# Patient Record
Sex: FEMALE | Race: WHITE | NOT HISPANIC OR LATINO | URBAN - METROPOLITAN AREA
[De-identification: names, ages, dates, MRNs, and addresses within clinical notes are randomized per-mention and may not be internally consistent; named-entity substitution may affect disease eponyms.]

---

## 2022-08-09 ENCOUNTER — EMERGENCY (EMERGENCY)
Facility: HOSPITAL | Age: 27
LOS: 1 days | Discharge: HOME | End: 2022-08-09
Attending: EMERGENCY MEDICINE | Admitting: EMERGENCY MEDICINE

## 2022-08-09 VITALS
TEMPERATURE: 98 F | OXYGEN SATURATION: 100 % | SYSTOLIC BLOOD PRESSURE: 97 MMHG | HEART RATE: 62 BPM | DIASTOLIC BLOOD PRESSURE: 62 MMHG | RESPIRATION RATE: 18 BRPM

## 2022-08-09 VITALS
SYSTOLIC BLOOD PRESSURE: 99 MMHG | DIASTOLIC BLOOD PRESSURE: 65 MMHG | RESPIRATION RATE: 18 BRPM | HEART RATE: 57 BPM | OXYGEN SATURATION: 100 % | TEMPERATURE: 98 F | WEIGHT: 134.92 LBS | HEIGHT: 65 IN

## 2022-08-09 DIAGNOSIS — R42 DIZZINESS AND GIDDINESS: ICD-10-CM

## 2022-08-09 DIAGNOSIS — I45.6 PRE-EXCITATION SYNDROME: ICD-10-CM
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
## 2022-08-09 DIAGNOSIS — R19.7 DIARRHEA, UNSPECIFIED: ICD-10-CM

## 2022-08-09 DIAGNOSIS — R00.1 BRADYCARDIA, UNSPECIFIED: ICD-10-CM

## 2022-08-09 DIAGNOSIS — S80.211A ABRASION, RIGHT KNEE, INITIAL ENCOUNTER: ICD-10-CM

## 2022-08-09 DIAGNOSIS — R55 SYNCOPE AND COLLAPSE: ICD-10-CM

## 2022-08-09 PROCEDURE — 93010 ELECTROCARDIOGRAM REPORT: CPT | Mod: NC

## 2022-08-09 PROCEDURE — 99284 EMERGENCY DEPT VISIT MOD MDM: CPT

## 2022-08-09 RX ORDER — SODIUM CHLORIDE 9 MG/ML
1000 INJECTION INTRAMUSCULAR; INTRAVENOUS; SUBCUTANEOUS ONCE
Refills: 0 | Status: COMPLETED | OUTPATIENT
Start: 2022-08-09 | End: 2022-08-09

## 2022-08-09 RX ADMIN — SODIUM CHLORIDE 2000 MILLILITER(S): 9 INJECTION INTRAMUSCULAR; INTRAVENOUS; SUBCUTANEOUS at 16:44

## 2022-08-09 NOTE — ED PROVIDER NOTE - PATIENT PORTAL LINK FT
You can access the FollowMyHealth Patient Portal offered by [facility] by registering at the following website: [email or web address]. By joining [employer]’s FollowMyHealth portal, you will also be able to view your health information using other applications (apps) compatible with our system.

## 2022-08-09 NOTE — ED PROVIDER NOTE - OBJECTIVE STATEMENT
27 y/o F otherwise healthy and not on chronic daily medications presenting today after syncopal episode. Pt states she started menstruating 2 days ago and has had stomach aches and diarrhea, which is normally associated with her menses. Pt had a psychologist appointment today, which she has never been to before, and while there she felt a "knot in her stomach". Pt felt lightheaded and that she needed to have a BM, so she got up, and felt more lightheaded then synopsized. She says she fell to her right knee, then onto her back, but did not hit her head. When she came to, she had loose non-bloody diarrhea and another episode of lightheadedness. Still at the psychologist office, the EMS were called and pt had another episode of loose non-bloody diarrhea. EMS determined pt's BP was low 80/40 and HR was low. IV was placed in the field and she was brought here for further evaluation. No CP, SOB, fever/chills, recent illnesses, or any other complaints. Pt endorses taking Midol today for cramps. She has had episodes of dizziness, cramps, and feeling like she needed to have a BM in the context of having menses, however no LOC. 27 y/o F, otherwise healthy, and not on chronic daily medications presenting today after syncopal episode. Pt states she started menstruating 2 days ago and has had stomach aches and diarrhea, which is normally associated with her menses. Pt had a psychologist appointment today, which she has never been to before, and while there she felt a "knot in her stomach". Pt felt lightheaded and that she needed to have a BM, so she got up, and felt more lightheaded and then had a syncopal episode. She says she fell to her right knee, then onto her back, but did not hit her head. When she came to, she had loose non-bloody diarrhea and another episode of lightheadedness. Still at the psychologist office, EMS were called and pt had another episode of loose non-bloody diarrhea. EMS determined pt's BP was low 80/40 and HR was low. IV was placed in the field and pt was started on IVF and she was brought here for further evaluation. No CP, SOB, fever/chills, recent illnesses, or any other complaints. Pt endorses taking Midol today for cramps. She has had episodes of dizziness in the context of abd cramps, and feeling like she needed to have a BM in the past, however no LOC.

## 2022-08-09 NOTE — ED PROVIDER NOTE - CLINICAL SUMMARY MEDICAL DECISION MAKING FREE TEXT BOX
27 y/o F presenting today after syncopal episode and episodes of loose non-bloody diarrhea. ED course vital signs are noted. Pt is bradycardic, HR in the 50s, hypotensive with BP of 99/65, and afebrile. Will obtain medical labs, ECG, and urine/pregnancy tests. Will provide another liter of fluids. Finger stick done in the ED is 95. SORAYA is noted and pt with WPW. This was discussed with pt who was unaware of this. Will follow up with a cardiologist outpatient. 27 y/o F presenting today after syncopal episode.   ED course vital signs are noted. Pt is bradycardic, HR in the 50s, BP of 99/65, and afebrile. Will obtain medical labs, ECG, and urine/pregnancy tests. Will give another liter of IV fluids. Finger stick done in the ED is 95. ECG is noted and pt with WPW and bradycardia. This was discussed with pt who was unaware of this. Will follow up with a cardiologist outpatient. Labs WNL. Urine pregnancy negative. Pt reassured. Sxs likely sec to vasovagal episode. Non-toxic appearing and stable for discharge. To follow up outpatient. Strict return precautions given.

## 2022-08-09 NOTE — ED PROVIDER NOTE - NSFOLLOWUPCLINICS_GEN_ALL_ED_FT
[facility]  Family Medicine  [address]  [place], [place] [address]  Phone: [phone]  Fax:   Follow Up Time: 4-6 Days

## 2022-08-09 NOTE — ED PROVIDER NOTE - NSFOLLOWUPINSTRUCTIONS_ED_ALL_ED_FT
Please follow up with your primary care doctor in 3-4 days and a Cardiologist in a week. Return to the ER if you develop any concerning symptoms.     Syncope    Syncope is when you temporarily lose consciousness, also called fainting or passing out. It is caused by a sudden decrease in blood flow to the brain. Even though most causes of syncope are not dangerous, syncope can possibly be a sign of a serious medical problem. Signs that you may be about to faint include feeling dizzy, lightheaded, nausea, visual changes, or cold/clammy skin. Do not drive, operate heavy machinery, or play sports until your health care provider says it is okay.    SEEK IMMEDIATE MEDICAL CARE IF YOU HAVE ANY OF THE FOLLOWING SYMPTOMS: severe headache, pain in your chest/abdomen/back, bleeding from your mouth or rectum, palpitations, shortness of breath, pain with breathing, seizure, confusion, or trouble walking.      Cristy-Parkinson-White Syndrome    WHAT YOU NEED TO KNOW:    Cristy-Parkinson-White (WPW) syndrome is a condition that causes tachycardia (fast heartbeat). A normal heartbeat is about 60 to 100 beats per minute. WPW causes 100 or more heartbeats per minute. WPW develops because an extra piece of heart muscle causes more electrical activity within your heart. WPW can develop for no known reason. Congenital heart disease or a family history of WPW can increase your risk.     DISCHARGE INSTRUCTIONS:    Return to the emergency department if:   •You have chest pain.      •You have fast or abnormal heartbeats even after treatment.      •You feel dizzy or faint.      Contact your healthcare provider if:   •You have questions or concerns about your condition or care.          Medicines:   •Medicines may be given to slow or regulate your heartbeat.      •Take your medicine as directed. Contact your healthcare provider if you think your medicine is not helping or if you have side effects. Tell him or her if you are allergic to any medicine. Keep a list of the medicines, vitamins, and herbs you take. Include the amounts, and when and why you take them. Bring the list or the pill bottles to follow-up visits. Carry your medicine list with you in case of an emergency.      Follow up with your healthcare provider as directed: Write down your questions so you remember to ask them during your visits.    Vagal maneuvers: Vagal maneuvers are methods that can slow your heartbeat during a WPW episode. Your healthcare provider may recommend you cough, gag, hold your breath, or put ice on your face.     Do not smoke: Smoking narrows blood vessels in your heart. Narrow blood vessels make your heart work harder. Smoking can also damage your heart. Ask your healthcare provider for information if you currently smoke and need help quitting.     Carry medical alert identification: Wear jewelry or carry a card that says you have WPW. Ask your healthcare provider where to get these items.    Exercise as directed: Exercise can cause episodes of irregular heartbeats. Ask your healthcare provider how much exercise you need each day and which exercises are safe for you. Ask if you can play sports.    Limit caffeine: Caffeine can make your heartbeat faster.

## 2022-08-09 NOTE — ED ADULT NURSE NOTE - ASSOCIATED SYMPTOMS
Patient A0x3 ambulatory came to ED BIBA after a syncopal episode states that she was told by EMS that her blood pressure was low. Denies hitting her head, any chest pain, resp issues.

## 2022-08-09 NOTE — ED ADULT TRIAGE NOTE - CHIEF COMPLAINT QUOTE
Pt BIBA for syncopal episode. Pt denies hitting head. Per EMS, BP 81/42 upon arrival. EMS inserted 18G into L AC and administered 500mL NS. EMS also states EKG looked concerning for possible WPW

## 2022-08-09 NOTE — ED PROVIDER NOTE - CARE PROVIDER_API CALL
Melvin Caballero  CARDIOLOGY  [address] [address]  Phone: [phone]  Fax: [phone]  Follow Up Time:

## 2022-08-26 ENCOUNTER — EMERGENCY (EMERGENCY)
Facility: HOSPITAL | Age: 27
LOS: 1 days | Discharge: HOME | End: 2022-08-26
Attending: EMERGENCY MEDICINE | Admitting: EMERGENCY MEDICINE

## 2022-08-26 VITALS
RESPIRATION RATE: 16 BRPM | HEART RATE: 72 BPM | TEMPERATURE: 98 F | WEIGHT: 126.99 LBS | SYSTOLIC BLOOD PRESSURE: 116 MMHG | OXYGEN SATURATION: 100 % | HEIGHT: 65 IN | DIASTOLIC BLOOD PRESSURE: 83 MMHG

## 2022-08-26 VITALS
OXYGEN SATURATION: 100 % | RESPIRATION RATE: 16 BRPM | HEART RATE: 64 BPM | DIASTOLIC BLOOD PRESSURE: 75 MMHG | TEMPERATURE: 98 F | SYSTOLIC BLOOD PRESSURE: 108 MMHG

## 2022-08-26 LAB
ALBUMIN SERPL ELPH-MCNC: 4.5 G/DL — SIGNIFICANT CHANGE UP (ref 3.4–5)
ALP SERPL-CCNC: 45 U/L — SIGNIFICANT CHANGE UP (ref 40–120)
ALT FLD-CCNC: 13 U/L — SIGNIFICANT CHANGE UP (ref 12–42)
ANION GAP SERPL CALC-SCNC: 9 MMOL/L — SIGNIFICANT CHANGE UP (ref 9–16)
AST SERPL-CCNC: 16 U/L — SIGNIFICANT CHANGE UP (ref 15–37)
BASOPHILS # BLD AUTO: 0.1 K/UL — SIGNIFICANT CHANGE UP (ref 0–0.2)
BASOPHILS NFR BLD AUTO: 1 % — SIGNIFICANT CHANGE UP (ref 0–2)
BILIRUB SERPL-MCNC: 0.2 MG/DL — SIGNIFICANT CHANGE UP (ref 0.2–1.2)
BUN SERPL-MCNC: 16 MG/DL — SIGNIFICANT CHANGE UP (ref 7–23)
CALCIUM SERPL-MCNC: 9.6 MG/DL — SIGNIFICANT CHANGE UP (ref 8.5–10.5)
CHLORIDE SERPL-SCNC: 104 MMOL/L — SIGNIFICANT CHANGE UP (ref 96–108)
CO2 SERPL-SCNC: 27 MMOL/L — SIGNIFICANT CHANGE UP (ref 22–31)
CREAT SERPL-MCNC: 1 MG/DL — SIGNIFICANT CHANGE UP (ref 0.5–1.3)
D DIMER BLD IA.RAPID-MCNC: <187 NG/ML DDU — SIGNIFICANT CHANGE UP
EGFR: 79 ML/MIN/1.73M2 — SIGNIFICANT CHANGE UP
EOSINOPHIL # BLD AUTO: 0.59 K/UL — HIGH (ref 0–0.5)
EOSINOPHIL NFR BLD AUTO: 5.7 % — SIGNIFICANT CHANGE UP (ref 0–6)
GLUCOSE SERPL-MCNC: 89 MG/DL — SIGNIFICANT CHANGE UP (ref 70–99)
HCG SERPL-ACNC: <1 MIU/ML — SIGNIFICANT CHANGE UP
HCT VFR BLD CALC: 39.6 % — SIGNIFICANT CHANGE UP (ref 34.5–45)
HGB BLD-MCNC: 13.2 G/DL — SIGNIFICANT CHANGE UP (ref 11.5–15.5)
IMM GRANULOCYTES NFR BLD AUTO: 0.4 % — SIGNIFICANT CHANGE UP (ref 0–1.5)
LYMPHOCYTES # BLD AUTO: 39.4 % — SIGNIFICANT CHANGE UP (ref 13–44)
LYMPHOCYTES # BLD AUTO: 4.06 K/UL — HIGH (ref 1–3.3)
MCHC RBC-ENTMCNC: 31.3 PG — SIGNIFICANT CHANGE UP (ref 27–34)
MCHC RBC-ENTMCNC: 33.3 GM/DL — SIGNIFICANT CHANGE UP (ref 32–36)
MCV RBC AUTO: 93.8 FL — SIGNIFICANT CHANGE UP (ref 80–100)
MONOCYTES # BLD AUTO: 0.72 K/UL — SIGNIFICANT CHANGE UP (ref 0–0.9)
MONOCYTES NFR BLD AUTO: 7 % — SIGNIFICANT CHANGE UP (ref 2–14)
NEUTROPHILS # BLD AUTO: 4.8 K/UL — SIGNIFICANT CHANGE UP (ref 1.8–7.4)
NEUTROPHILS NFR BLD AUTO: 46.5 % — SIGNIFICANT CHANGE UP (ref 43–77)
NRBC # BLD: 0 /100 WBCS — SIGNIFICANT CHANGE UP (ref 0–0)
PLATELET # BLD AUTO: 257 K/UL — SIGNIFICANT CHANGE UP (ref 150–400)
POTASSIUM SERPL-MCNC: 4.4 MMOL/L — SIGNIFICANT CHANGE UP (ref 3.5–5.3)
POTASSIUM SERPL-SCNC: 4.4 MMOL/L — SIGNIFICANT CHANGE UP (ref 3.5–5.3)
PROT SERPL-MCNC: 7.8 G/DL — SIGNIFICANT CHANGE UP (ref 6.4–8.2)
RBC # BLD: 4.22 M/UL — SIGNIFICANT CHANGE UP (ref 3.8–5.2)
RBC # FLD: 12.1 % — SIGNIFICANT CHANGE UP (ref 10.3–14.5)
SARS-COV-2 RNA SPEC QL NAA+PROBE: SIGNIFICANT CHANGE UP
SODIUM SERPL-SCNC: 140 MMOL/L — SIGNIFICANT CHANGE UP (ref 132–145)
WBC # BLD: 10.31 K/UL — SIGNIFICANT CHANGE UP (ref 3.8–10.5)
WBC # FLD AUTO: 10.31 K/UL — SIGNIFICANT CHANGE UP (ref 3.8–10.5)

## 2022-08-26 PROCEDURE — 71045 X-RAY EXAM CHEST 1 VIEW: CPT | Mod: 26

## 2022-08-26 PROCEDURE — 93010 ELECTROCARDIOGRAM REPORT: CPT | Mod: NC

## 2022-08-26 PROCEDURE — 99285 EMERGENCY DEPT VISIT HI MDM: CPT | Mod: 25

## 2022-08-26 NOTE — ED PROVIDER NOTE - PROGRESS NOTE DETAILS
Tiffanie Spears MD (PGY3) - Pt seen & reassessed.  Pt symptomatically improved.  NAD, ambulated w/steady unassisted gait in the ED.  We discussed the results of ED w/u w/patient (incl. presumptive Emergency Department dx, associated anticipatory guidance, stressing importance of prompt f/u, return precautions), & gave them a copy of results.  Patient verbalized understanding of ED course & agreed with our f/u recommendations, has decisional making capacity.  Pt st they will f/u w/ cardiology. Pt agrees to return to the ED if there is any worsening or concerning symptoms.

## 2022-08-26 NOTE — ED PROVIDER NOTE - OBJECTIVE STATEMENT
27yo F PMH recently diagnosed WPW presents with SOB, chest pain, palpitations. Lasted 1 hr. States she was also wheezing during this episode. No prior history of RAD or asthma in the past, not an active smoker. Sitting in bed while this occurred. Cardiology appointment post WPW diagnosis, had echo and holter monitor, states she has an appointment with her again next week. Denies any chest pain or SOB at this time. Denies LE edema or calf tenderness. No F/C/NS/N/V/D. No prior history of the same.

## 2022-08-26 NOTE — ED PROVIDER NOTE - PATIENT PORTAL LINK FT
You can access the FollowMyHealth Patient Portal offered by [facility] by registering at the following website: [email or web address]. By joining [employer]’s FollowMyHealth portal, you will also be able to view your health information using other applications (apps) compatible with our system.

## 2022-08-26 NOTE — ED PROVIDER NOTE - NSFOLLOWUPINSTRUCTIONS_ED_ALL_ED_FT
You should return to the hospital if you begin to have worsening or persistent chest pain especially that radiates to the arm/jaw/back, shortness of breath or chest pain with exertion that is different than normal for you, new or worsening in any lower extremity edema (swelling), sudden onset of cold sweats with difficulty breathing, or for any other concerns.    Please return to the Emergency Department if you experience any of the following symptoms:   - Shortness of breath or trouble breathing  - Pressure, pain or tightness in the chest  - Face drooping, arm weakness or speech difficulty  - Persistence of severe vomiting  - Head injury or loss of consciousness  - Nonstop bleeding or an open wound    (1) Follow up with your primary care physician within the next 24-48 hours as discussed. In addition, we did not find evidence of a life threatening illness on your testing here today, but listed below are the specialists that will be necessary to see as an outpatient to continue the workup.  Please call the numbers listed below or [phone] to set up the necessary appointments.  (2) If you had an IV (intravenous) line placed, it was removed. Sometimes, after IV removal, that area can be tender for a few days; if it develops redness and swelling, those could be signs of infection; in which case, return to the Emergency Department for assessment.  (3) Please continue taking all of your home medications as directed.

## 2022-08-26 NOTE — ED ADULT NURSE NOTE - OBJECTIVE STATEMENT
Presents for c/o episode fo palpitations with SOB described as "inability to take a deep breath" with wheezing per pt, corroborated by bf, pt tried to take warm shower to open airways, lasted two hours and self-resolved before coming to ED, advised to present for ED eval by cardiologist. Pt notes recent adoption fo cat, hx cat hair allergy. PMH recent WPW. Presently, denies CP/SOB/weakness/dizziness/tingling/cough/fevers/known sick contacts.    On assessment- AOx4, breathing even and unlabored on RA, no apparent distress, VSS in triage, able to speak in clear coherent sentences without wheezing, clear resp sounds bilat, steady gait unassisted, neuro intact with no apparent facial asymmetry, PERRLA.

## 2022-08-26 NOTE — ED ADULT TRIAGE NOTE - CHIEF COMPLAINT QUOTE
female patient c/o chest pain w/ sob and tachycardia x 2 hrs. patient reports that she was recently diagnosed with WPW syndrome here; and has seen cardiologist follow up. patient was advised by cardiologist; if symptoms persist to be eval'd in ED

## 2022-08-26 NOTE — ED PROVIDER NOTE - ATTENDING CONTRIBUTION TO CARE
25 yo female with recently dx WPW with c/o episode of tachycardia she noted at home. She had seen cariology after her prior visit to the ED, she had an echo and had holter placed which has yet to be results. She had en episode of tachycardia today, she measured her HR to be regular approx 117, it lasted for approx 1 hour then resolved spontaneously, no syncope, no chest pain, she reports she had some SOB, felt some wheezing (but she has allergies and currently has a cat, known cat allergies, she has been taking zyrtec - D for this).   VSS in NAD non toxic appearing   NCAT EOMI PERRL OP clear  heart RRR no murmur   lungs CTA no wheezing no rales no rhonchi   normal neuro exam CN I-XII grossly intact, no groos motor or sensory deficits  no peripheral c/c/e  WPW, tachycardia episode, resolved. labs OK, instructed to followup with outpatient cardiology, return for worsening sx or sustained tachycardia.

## 2022-08-26 NOTE — ED PROVIDER NOTE - PHYSICAL EXAMINATION
G: NAD, cooperative with exam   H: NCAT  E: EOMI   M: Mucous membranes moist   R: CTABL, nWOB  C: RRR  A: Soft, NT/ND, no rebound/guarding   MSK: no calf tenderness, no LE edema

## 2022-08-26 NOTE — ED PROVIDER NOTE - NS ED ATTENDING STATEMENT MOD
I have seen and examined this patient and fully participated in the care of this patient as the teaching attending.  The service was shared with the GISSELLE.  I reviewed and verified the documentation and independently performed the documented:

## 2022-08-26 NOTE — ED PROVIDER NOTE - CLINICAL SUMMARY MEDICAL DECISION MAKING FREE TEXT BOX
27yo F PMH recently diagnosed WPW presents with SOB, chest pain, palpitations. Will obtain labs, ecg, cxr, reassess.

## 2022-08-29 DIAGNOSIS — R06.02 SHORTNESS OF BREATH: ICD-10-CM

## 2022-08-29 DIAGNOSIS — R00.2 PALPITATIONS: ICD-10-CM

## 2022-08-29 DIAGNOSIS — I45.6 PRE-EXCITATION SYNDROME: ICD-10-CM

## 2022-08-29 DIAGNOSIS — Z20.822 CONTACT WITH AND (SUSPECTED) EXPOSURE TO COVID-19: ICD-10-CM

## 2022-08-29 DIAGNOSIS — R07.89 OTHER CHEST PAIN: ICD-10-CM
